# Patient Record
Sex: MALE | Race: WHITE | Employment: FULL TIME | URBAN - NONMETROPOLITAN AREA
[De-identification: names, ages, dates, MRNs, and addresses within clinical notes are randomized per-mention and may not be internally consistent; named-entity substitution may affect disease eponyms.]

---

## 2020-07-30 ENCOUNTER — APPOINTMENT (OUTPATIENT)
Dept: GENERAL RADIOLOGY | Age: 40
End: 2020-07-30
Payer: COMMERCIAL

## 2020-07-30 ENCOUNTER — HOSPITAL ENCOUNTER (EMERGENCY)
Age: 40
Discharge: HOME | End: 2020-07-30
Attending: FAMILY MEDICINE
Payer: COMMERCIAL

## 2020-07-30 VITALS
TEMPERATURE: 98.2 F | HEART RATE: 82 BPM | BODY MASS INDEX: 26.48 KG/M2 | HEIGHT: 70 IN | WEIGHT: 185 LBS | RESPIRATION RATE: 14 BRPM | DIASTOLIC BLOOD PRESSURE: 86 MMHG | SYSTOLIC BLOOD PRESSURE: 125 MMHG | OXYGEN SATURATION: 98 %

## 2020-07-30 PROCEDURE — 12002 RPR S/N/AX/GEN/TRNK2.6-7.5CM: CPT

## 2020-07-30 PROCEDURE — 6370000000 HC RX 637 (ALT 250 FOR IP)

## 2020-07-30 PROCEDURE — 73130 X-RAY EXAM OF HAND: CPT

## 2020-07-30 PROCEDURE — 99284 EMERGENCY DEPT VISIT MOD MDM: CPT

## 2020-07-30 RX ORDER — BACITRACIN, NEOMYCIN, POLYMYXIN B 400; 3.5; 5 [USP'U]/G; MG/G; [USP'U]/G
OINTMENT TOPICAL
Status: COMPLETED
Start: 2020-07-30 | End: 2020-07-30

## 2020-07-30 RX ORDER — IBUPROFEN 200 MG
CAPSULE ORAL ONCE
Status: COMPLETED | OUTPATIENT
Start: 2020-07-30 | End: 2020-07-30

## 2020-07-30 RX ORDER — LIDOCAINE HYDROCHLORIDE 10 MG/ML
10 INJECTION, SOLUTION EPIDURAL; INFILTRATION; INTRACAUDAL; PERINEURAL ONCE
Status: DISCONTINUED | OUTPATIENT
Start: 2020-07-30 | End: 2020-07-30 | Stop reason: HOSPADM

## 2020-07-30 RX ADMIN — Medication: at 11:54

## 2020-07-30 RX ADMIN — BACITRACIN ZINC, POLYMYXIN B SULFATE, NEOMYCIN SULFATE: 400; 5000; 3.5 OINTMENT TOPICAL at 11:54

## 2020-07-30 ASSESSMENT — PAIN DESCRIPTION - LOCATION: LOCATION: FINGER (COMMENT WHICH ONE)

## 2020-07-30 ASSESSMENT — PAIN DESCRIPTION - DESCRIPTORS: DESCRIPTORS: THROBBING

## 2020-07-30 ASSESSMENT — PAIN DESCRIPTION - FREQUENCY: FREQUENCY: CONTINUOUS

## 2020-07-30 ASSESSMENT — PAIN DESCRIPTION - ORIENTATION: ORIENTATION: RIGHT

## 2020-07-30 ASSESSMENT — PAIN SCALES - GENERAL: PAINLEVEL_OUTOF10: 4

## 2020-07-30 ASSESSMENT — PAIN DESCRIPTION - PAIN TYPE: TYPE: ACUTE PAIN

## 2020-07-30 NOTE — ED TRIAGE NOTES
Patient presents to ER with complaints of right hand finger injury to the 4th and 5th digits that occurred today after working out. Patient reports he was putting dumbbells down to the floor and pinched his right hand fingers under another set of dumbbells below the bench.

## 2020-07-30 NOTE — ED PROVIDER NOTES
EMERGENCY DEPARTMENT ENCOUNTER     CHIEF COMPLAINT   Chief Complaint   Patient presents with    Finger Injury     right hand 4th and 5th digits        BRANDON Rodriguez is a 36 y.o. male who presents with extremity pain after crush injury to right ring finger. A 45 lb free weight dropped onto his hand while working out at the gym. The onset was PTA. The reason why the patient has this issue was there was a laceration to the finger as well. The patient complains of pain and some mild bleeding. The quality is throbbing. The patient has no associated paresthesia or cyanosis. REVIEW OF SYSTEMS   Neurologic: No head injury or LOC   Cardiac: No Chest Pain, No syncope   Respiratory: Nodifficulty breathing   GI:No abdominal pain or vomiting   MSK: +pain in extremities  Integument: laceration to right ring finger  See history of present illness   All other review of systems were reviewed and are otherwise negative. PAST MEDICAL & SURGICAL HISTORY   History reviewed. No pertinent past medical history. History reviewed. No pertinent surgical history.      CURRENT MEDICATIONS        ALLERGIES   Allergies no known allergies     SOCIAL & FAMILY HISTORY   Social History     Socioeconomic History    Marital status:      Spouse name: None    Number of children: None    Years of education: None    Highest education level: None   Occupational History    None   Social Needs    Financial resource strain: None    Food insecurity     Worry: None     Inability: None    Transportation needs     Medical: None     Non-medical: None   Tobacco Use    Smoking status: Never Smoker    Smokeless tobacco: Never Used   Substance and Sexual Activity    Alcohol use: Yes     Comment: socially    Drug use: Never    Sexual activity: None   Lifestyle    Physical activity     Days per week: None     Minutes per session: None    Stress: None   Relationships    Social connections     Talks on phone: None     Gets together: None     Attends [employer] service: None     Active member of club or organization: None     Attends meetings of clubs or organizations: None     Relationship status: None    Intimate partner violence     Fear of current or ex partner: None     Emotionally abused: None     Physically abused: None     Forced sexual activity: None   Other Topics Concern    None   Social History Narrative    None      History reviewed. No pertinent family history. PHYSICAL EXAM   VITAL SIGNS: /86   Pulse 82   Temp 98.2 °F (36.8 °C) (Oral)   Resp 14   Ht 5' 10\" (1.778 m)   Wt 185 lb (83.9 kg)   SpO2 98%   BMI 26.54 kg/m²    Constitutional: Well developed, well nourished, no acute distress   Eyes: Pupils equally round and react to light, sclera nonicteric   HENT: Atraumatic, no trismus   Neck: supple, no JVD, no posterior neck tenderness   Respiratory: Lungs Clear, no retractions   Cardiovascular: Reg rate, no murmurs   Vascular: 2+ radial pulse to RUE  GI: Soft, nontender, normal bowel sounds   Musculoskeletal: No edema, no obvious deformity to affected right ring finger  Integument: Well hydrated, no petechiae, 3 cm mostly linear laceration to volar aspect of right ring finger; mild subungal hematoma to right ring finger nail. Neurologic: Alert & oriented, no slurred speech, 5/5  strength noted to both hands; pt has intact DIP and PIP joint flexions for affected right ring finger (no signs of tendon damage)      Lac Repair    Date/Time: 7/30/2020 11:47 AM  Performed by: Ynes Garg MD  Authorized by: Ynes Garg MD     Consent:     Consent obtained:  Verbal    Consent given by:  Patient    Risks discussed:  Poor cosmetic result and tendon damage  Anesthesia (see MAR for exact dosages):      Anesthesia method:  Nerve block    Block needle gauge:  27 G    Block anesthetic:  Lidocaine 1% w/o epi    Block technique:  Digital block right ring finger    Block injection procedure:  Anatomic landmarks identified Block outcome:  Anesthesia achieved  Laceration details:     Location:  Finger    Finger location:  R ring finger    Length (cm):  4    Depth (mm):  0.5  Repair type:     Repair type:  Simple  Pre-procedure details:     Preparation:  Patient was prepped and draped in usual sterile fashion  Exploration:     Hemostasis achieved with:  Direct pressure    Wound exploration: wound explored through full range of motion and entire depth of wound probed and visualized      Wound extent: no foreign bodies/material noted, no nerve damage noted and no tendon damage noted      Contaminated: no    Treatment:     Area cleansed with:  Saline    Amount of cleaning:  Extensive    Irrigation solution:  Sterile saline    Irrigation volume:  50 mL    Irrigation method:  Syringe    Visualized foreign bodies/material removed: no    Skin repair:     Repair method:  Sutures    Suture size:  5-0    Suture material:  Nylon    Suture technique:  Simple interrupted    Number of sutures:  8  Approximation:     Approximation:  Close  Post-procedure details:     Dressing:  Antibiotic ointment    Patient tolerance of procedure: Tolerated well, no immediate complications  Comments:      Pt did have mild vasovagal reaction, causing to go pale, but felt better after a couple of cool rags      RADIOLOGY   XR HAND RIGHT (MIN 3 VIEWS)   Final Result      Lucency at the tuft of the fifth distal phalanx suspicious for nondisplaced fracture. Clinical correlation with site of patient's pain is recommended. Final report electronically signed by Dr. Nikolai Mack on 7/30/2020 10:49 AM           Labs Reviewed - No data to display     ED [facility]   Pertinent Labs & Imaging studies reviewed and interpreted.  (See chart for details)   Vitals:    07/30/20 1007   BP: 125/86   Pulse: 82   Resp: 14   Temp: 98.2 °F (36.8 °C)   SpO2: 98%        Differential Diagnosis: Occult fracture, laceration, extremity sprain and injury    FINAL IMPRESSION   1. Laceration without foreign body of right ring finger without damage to nail, initial encounter    2. Subungual hematoma of right little finger, initial encounter    3. Closed fracture of tuft of distal phalanx of finger         PLAN   MDM - pt likely has simple laceration of volar aspect of finger, assuming no flexor tendon injury, can be safely sutured up via digital block. Pt appears to have intact neurovascular status at this time. He also has a mild subungal hematoma of right little finger which does not need any intervention at this time. Pt tolerated the suture repair procedure and told about his injuries including tuft fracture of little finger. No splint is needed there, but I did order the splint for the ring finger which received the sutures.     Final disposition: home via discharge  Electronically signed by: Jimbo Karimi, 7/30/2020 11:52 AM         Nancy Pack MD  07/30/20 9221